# Patient Record
Sex: MALE | Race: WHITE | NOT HISPANIC OR LATINO | Employment: OTHER | URBAN - NONMETROPOLITAN AREA
[De-identification: names, ages, dates, MRNs, and addresses within clinical notes are randomized per-mention and may not be internally consistent; named-entity substitution may affect disease eponyms.]

---

## 2020-11-20 ENCOUNTER — OUTSIDE FACILITY SERVICE (OUTPATIENT)
Dept: CARDIOLOGY | Facility: CLINIC | Age: 75
End: 2020-11-20

## 2020-11-20 PROCEDURE — 93010 ELECTROCARDIOGRAM REPORT: CPT | Performed by: INTERNAL MEDICINE

## 2023-03-28 ENCOUNTER — PROCEDURE VISIT (OUTPATIENT)
Dept: ENT CLINIC | Age: 78
End: 2023-03-28
Payer: MEDICARE

## 2023-03-28 ENCOUNTER — OFFICE VISIT (OUTPATIENT)
Dept: ENT CLINIC | Age: 78
End: 2023-03-28
Payer: MEDICARE

## 2023-03-28 VITALS
HEIGHT: 70 IN | BODY MASS INDEX: 25.77 KG/M2 | SYSTOLIC BLOOD PRESSURE: 124 MMHG | DIASTOLIC BLOOD PRESSURE: 70 MMHG | WEIGHT: 180 LBS

## 2023-03-28 DIAGNOSIS — H69.83 DYSFUNCTION OF BOTH EUSTACHIAN TUBES: Primary | ICD-10-CM

## 2023-03-28 DIAGNOSIS — H90.A22 SENSORINEURAL HEARING LOSS (SNHL) OF LEFT EAR WITH RESTRICTED HEARING OF RIGHT EAR: ICD-10-CM

## 2023-03-28 DIAGNOSIS — H90.8 MIXED CONDUCTIVE AND SENSORINEURAL HEARING LOSS, UNSPECIFIED LATERALITY: ICD-10-CM

## 2023-03-28 DIAGNOSIS — H65.91 MEE (MIDDLE EAR EFFUSION), RIGHT: Primary | ICD-10-CM

## 2023-03-28 DIAGNOSIS — H90.A31 MIXED CONDUCTIVE AND SENSORINEURAL HEARING LOSS OF RIGHT EAR WITH RESTRICTED HEARING OF LEFT EAR: ICD-10-CM

## 2023-03-28 PROCEDURE — 4004F PT TOBACCO SCREEN RCVD TLK: CPT | Performed by: PHYSICIAN ASSISTANT

## 2023-03-28 PROCEDURE — 99203 OFFICE O/P NEW LOW 30 MIN: CPT | Performed by: PHYSICIAN ASSISTANT

## 2023-03-28 PROCEDURE — G8427 DOCREV CUR MEDS BY ELIG CLIN: HCPCS | Performed by: PHYSICIAN ASSISTANT

## 2023-03-28 PROCEDURE — 92567 TYMPANOMETRY: CPT | Performed by: AUDIOLOGIST

## 2023-03-28 PROCEDURE — G8484 FLU IMMUNIZE NO ADMIN: HCPCS | Performed by: PHYSICIAN ASSISTANT

## 2023-03-28 PROCEDURE — 1123F ACP DISCUSS/DSCN MKR DOCD: CPT | Performed by: PHYSICIAN ASSISTANT

## 2023-03-28 PROCEDURE — G8417 CALC BMI ABV UP PARAM F/U: HCPCS | Performed by: PHYSICIAN ASSISTANT

## 2023-03-28 PROCEDURE — 92553 AUDIOMETRY AIR & BONE: CPT | Performed by: AUDIOLOGIST

## 2023-03-28 RX ORDER — AMLODIPINE BESYLATE 10 MG/1
TABLET ORAL
COMMUNITY
Start: 2023-01-05

## 2023-03-28 RX ORDER — CEFUROXIME AXETIL 500 MG/1
500 TABLET ORAL 2 TIMES DAILY
Qty: 20 TABLET | Refills: 0 | Status: SHIPPED | OUTPATIENT
Start: 2023-03-28 | End: 2023-04-07

## 2023-03-28 RX ORDER — ATORVASTATIN CALCIUM 40 MG/1
TABLET, FILM COATED ORAL
COMMUNITY

## 2023-03-28 RX ORDER — LOSARTAN POTASSIUM 25 MG/1
TABLET ORAL
COMMUNITY

## 2023-03-28 RX ORDER — WARFARIN SODIUM 5 MG/1
TABLET ORAL
COMMUNITY
Start: 2022-11-01

## 2023-03-28 RX ORDER — ASPIRIN 81 MG/1
TABLET ORAL
COMMUNITY

## 2023-03-28 RX ORDER — TRIAMCINOLONE ACETONIDE 55 UG/1
2 SPRAY, METERED NASAL 2 TIMES DAILY
Qty: 16 G | Refills: 2 | Status: SHIPPED | OUTPATIENT
Start: 2023-03-28

## 2023-03-28 RX ORDER — CYANOCOBALAMIN 1000 UG/ML
INJECTION, SOLUTION INTRAMUSCULAR; SUBCUTANEOUS
COMMUNITY
Start: 2023-02-23

## 2023-03-28 RX ORDER — METOPROLOL TARTRATE 100 MG/1
TABLET ORAL
COMMUNITY
Start: 2023-01-27

## 2023-03-28 RX ORDER — OMEPRAZOLE 20 MG/1
TABLET, DELAYED RELEASE ORAL
COMMUNITY

## 2023-03-28 RX ORDER — GLIPIZIDE 5 MG/1
TABLET ORAL
COMMUNITY

## 2023-03-28 RX ORDER — OXYMETAZOLINE HYDROCHLORIDE 0.05 G/100ML
SPRAY NASAL
Qty: 30 ML | Refills: 0 | Status: SHIPPED | OUTPATIENT
Start: 2023-03-28

## 2023-03-28 RX ORDER — AMIODARONE HYDROCHLORIDE 200 MG/1
TABLET ORAL
COMMUNITY
Start: 2023-03-26

## 2023-03-28 ASSESSMENT — ENCOUNTER SYMPTOMS
VOICE CHANGE: 0
PHOTOPHOBIA: 0
FACIAL SWELLING: 0
SORE THROAT: 0
TROUBLE SWALLOWING: 0
RHINORRHEA: 0
SINUS PRESSURE: 0
EYE PAIN: 0
SINUS PAIN: 0

## 2023-03-28 NOTE — PROGRESS NOTES
History   Wander Roberts is a 76 y.o. male who presented to the clinic this date with complaints of right TM perforation. His PCP also thought there might be blood behind the TM. About a month and a half ago his right ear popped on two occassions. He did not have drainage. He note right aural fullness and decreased hearing. He has tinnitus, primarily in the right ear. Summary   Tympanometry consistent with middle ear effusion right and negative middle ear pressure left. Pure tone testing indicates moderate to severe mixed hearing loss right and mld to severe SNHL left. Results   Otoscopy:   Right: Dull TM  Left: Clear EAC/Normal TM    Audiometry:   Right:  Moderate to severe  sloping mixed hearing loss  Left:  Mild to severe  sloping SNHL         Tympanometry:    Right: Type B  Left: Type C    Plan   Results of today's testing were discussed with Mr. Calin Noel and the following recommendations were made: Follow up with ENT as scheduled. Recheck hearing following medical management.          Audiogram and Acoustic Immittance

## 2023-03-28 NOTE — PROGRESS NOTES
no abnormalities to the posterior pharynx. Assessment & Plan:    Problem List Items Addressed This Visit       TENZIN (middle ear effusion), right - Primary     Right middle ear effusion with negative middle ear pressure to the left ear  Plan: I will place the patient on Nasacort spray twice a day as well as Afrin twice a day. Even though he is noted with bennett fluid, we will place him on Ceftin for 10 days for antibiotic coverage. He is to follow-up in 2 weeks for reevaluation. Mixed conductive and sensorineural hearing loss     Mixed moderate to severe hearing loss of the right ear with mild to severe sloping sensorineural hearing loss to the left ear  Plan: I recommended repeating his audiology study in 1 year due to his hearing deficit. He was also advised to do a hearing aid trial if he has issues trying to hear after the middle ear effusion has resolved. No orders of the defined types were placed in this encounter. Orders Placed This Encounter   Medications    triamcinolone (NASACORT) 55 MCG/ACT nasal inhaler     Si sprays by Each Nostril route 2 times daily     Dispense:  16 g     Refill:  2    oxymetazoline (AFRIN) 0.05 % nasal spray     Si squirts to each nostril twice a day x10 days     Dispense:  30 mL     Refill:  0    cefUROXime (CEFTIN) 500 MG tablet     Sig: Take 1 tablet by mouth 2 times daily for 10 days     Dispense:  20 tablet     Refill:  0       Electronically signed by Codie Veronica PA-C on 3/28/23 at 11:37 AM CDT        Please note that this chart was generated using dragon dictation software. Although every effort was made to ensure the accuracy of this automated transcription, some errors in transcription may have occurred.

## 2023-03-28 NOTE — ASSESSMENT & PLAN NOTE
Right middle ear effusion with negative middle ear pressure to the left ear  Plan: I will place the patient on Nasacort spray twice a day as well as Afrin twice a day. Even though he is noted with bennett fluid, we will place him on Ceftin for 10 days for antibiotic coverage. He is to follow-up in 2 weeks for reevaluation.

## 2023-03-28 NOTE — ASSESSMENT & PLAN NOTE
Mixed moderate to severe hearing loss of the right ear with mild to severe sloping sensorineural hearing loss to the left ear  Plan: I recommended repeating his audiology study in 1 year due to his hearing deficit. He was also advised to do a hearing aid trial if he has issues trying to hear after the middle ear effusion has resolved.

## 2023-03-28 NOTE — LETTER
March 28, 2023      Cody Martínez MD  [address] [id number]      Patient: Hellen Carney   MR Number: [id number]   YOB: 1945   Date of Visit: 3/28/2023       Dear Dr. Modesto Cross,    Thank you for referring Citlali Graf to me for evaluation/treatment. Below are the relevant portions of my assessment and plan of care. If you have questions, please do not hesitate to call me. I look forward to following Elsa Ochoa along with you.     Sincerely,  Lawson Brunson PA-C

## 2023-04-27 ENCOUNTER — OFFICE VISIT (OUTPATIENT)
Dept: ENT CLINIC | Age: 78
End: 2023-04-27
Payer: MEDICARE

## 2023-04-27 VITALS
BODY MASS INDEX: 25.91 KG/M2 | WEIGHT: 181 LBS | SYSTOLIC BLOOD PRESSURE: 130 MMHG | DIASTOLIC BLOOD PRESSURE: 70 MMHG | HEIGHT: 70 IN

## 2023-04-27 DIAGNOSIS — H65.91 MEE (MIDDLE EAR EFFUSION), RIGHT: Primary | ICD-10-CM

## 2023-04-27 PROCEDURE — 4004F PT TOBACCO SCREEN RCVD TLK: CPT | Performed by: PHYSICIAN ASSISTANT

## 2023-04-27 PROCEDURE — G8427 DOCREV CUR MEDS BY ELIG CLIN: HCPCS | Performed by: PHYSICIAN ASSISTANT

## 2023-04-27 PROCEDURE — 1123F ACP DISCUSS/DSCN MKR DOCD: CPT | Performed by: PHYSICIAN ASSISTANT

## 2023-04-27 PROCEDURE — 99213 OFFICE O/P EST LOW 20 MIN: CPT | Performed by: PHYSICIAN ASSISTANT

## 2023-04-27 PROCEDURE — G8417 CALC BMI ABV UP PARAM F/U: HCPCS | Performed by: PHYSICIAN ASSISTANT

## 2023-04-27 NOTE — PROGRESS NOTES
Mr. Amelia Corea is a pleasant 68-year-old  male that presents to follow-up due to a right middle ear effusion and negative middle ear pressure to the left ear. Patient reports he has completed the medication and feels like he has not had any change. Denies any drainage from the ear canal or any issues with vertigo. Physical examination with microscope revealed evidence of a persistent right middle ear effusion with probabilities of my exam.  Left ear demonstrated a retracted TM with no evidence of fluid. Tympanogram was repeated today demonstrated a type B to the right with the left side noted to be actually improved. Neck exam demonstrated no adenopathy thyromegaly. Oral exam is unrevealing. Impression: Persistent right middle ear effusion with failed medical management    Plan: I recommended the patient to see Dr. Redd Saldaña for consideration of a myringotomy to the right ear versus eustachian tube dilatation. Patient is agreeable and wishes to proceed. He was reminded to call if he has any questions or problems.       Electronically signed by Michael Laws PA-C on 4/27/23 at 12:25 PM CDT

## 2023-05-10 ENCOUNTER — OFFICE VISIT (OUTPATIENT)
Dept: ENT CLINIC | Age: 78
End: 2023-05-10
Payer: MEDICARE

## 2023-05-10 VITALS
DIASTOLIC BLOOD PRESSURE: 80 MMHG | HEIGHT: 70 IN | BODY MASS INDEX: 25.62 KG/M2 | WEIGHT: 179 LBS | SYSTOLIC BLOOD PRESSURE: 130 MMHG

## 2023-05-10 DIAGNOSIS — H69.81 DYSFUNCTION OF RIGHT EUSTACHIAN TUBE: Primary | ICD-10-CM

## 2023-05-10 DIAGNOSIS — H90.A31 MIXED CONDUCTIVE AND SENSORINEURAL HEARING LOSS OF RIGHT EAR WITH RESTRICTED HEARING OF LEFT EAR: ICD-10-CM

## 2023-05-10 PROCEDURE — 99214 OFFICE O/P EST MOD 30 MIN: CPT | Performed by: OTOLARYNGOLOGY

## 2023-05-10 PROCEDURE — 1036F TOBACCO NON-USER: CPT | Performed by: OTOLARYNGOLOGY

## 2023-05-10 PROCEDURE — G8427 DOCREV CUR MEDS BY ELIG CLIN: HCPCS | Performed by: OTOLARYNGOLOGY

## 2023-05-10 PROCEDURE — 1123F ACP DISCUSS/DSCN MKR DOCD: CPT | Performed by: OTOLARYNGOLOGY

## 2023-05-10 PROCEDURE — G8417 CALC BMI ABV UP PARAM F/U: HCPCS | Performed by: OTOLARYNGOLOGY

## 2023-05-10 PROCEDURE — 69420 INCISION OF EARDRUM: CPT | Performed by: OTOLARYNGOLOGY

## 2023-05-10 RX ORDER — OFLOXACIN 3 MG/ML
5 SOLUTION AURICULAR (OTIC) 2 TIMES DAILY
Qty: 10 ML | Refills: 0 | Status: SHIPPED | OUTPATIENT
Start: 2023-05-10 | End: 2023-05-13

## 2023-05-10 ASSESSMENT — ENCOUNTER SYMPTOMS
ALLERGIC/IMMUNOLOGIC NEGATIVE: 1
EYES NEGATIVE: 1
GASTROINTESTINAL NEGATIVE: 1
RESPIRATORY NEGATIVE: 1

## 2023-05-10 NOTE — PROGRESS NOTES
5/10/2023    Katerine Mejia (:  1945) is a 76 y.o. male, Established patient, here for evaluation of the following chief complaint(s):  New Patient (Right ear)      Vitals:    05/10/23 1345   BP: 130/80   Weight: 179 lb (81.2 kg)   Height: 5' 10\" (1.778 m)       Wt Readings from Last 3 Encounters:   05/10/23 179 lb (81.2 kg)   23 181 lb (82.1 kg)   23 180 lb (81.6 kg)       BP Readings from Last 3 Encounters:   05/10/23 130/80   23 130/70   23 124/70         SUBJECTIVE/OBJECTIVE:    Patient seen today for his ears. He suffers from pressure and hearing loss on the right. He says he heard a pop twice about a month and a half ago and then he could not hear out of that side. The left side has hearing loss but nearly as bad as the right. Recent hearing test does show a type B tympanogram on that side. Review of Systems   Constitutional: Negative. HENT:  Positive for hearing loss. Eyes: Negative. Respiratory: Negative. Cardiovascular: Negative. Gastrointestinal: Negative. Endocrine: Negative. Musculoskeletal: Negative. Skin: Negative. Allergic/Immunologic: Negative. Neurological: Negative. Hematological: Negative. Psychiatric/Behavioral: Negative. Physical Exam  Vitals reviewed. Constitutional:       Appearance: Normal appearance. He is normal weight. HENT:      Head: Normocephalic and atraumatic. Right Ear: Tympanic membrane, ear canal and external ear normal.      Left Ear: Tympanic membrane, ear canal and external ear normal.      Nose: Nose normal.      Mouth/Throat:      Mouth: Mucous membranes are moist.      Pharynx: Oropharynx is clear. Eyes:      Extraocular Movements: Extraocular movements intact. Pupils: Pupils are equal, round, and reactive to light. Cardiovascular:      Rate and Rhythm: Normal rate and regular rhythm.    Pulmonary:      Effort: Pulmonary effort is normal.      Breath sounds:

## 2023-06-07 ENCOUNTER — OFFICE VISIT (OUTPATIENT)
Dept: ENT CLINIC | Age: 78
End: 2023-06-07
Payer: MEDICARE

## 2023-06-07 VITALS
HEIGHT: 70 IN | WEIGHT: 176 LBS | BODY MASS INDEX: 25.2 KG/M2 | DIASTOLIC BLOOD PRESSURE: 76 MMHG | SYSTOLIC BLOOD PRESSURE: 132 MMHG

## 2023-06-07 DIAGNOSIS — H69.81 DYSFUNCTION OF RIGHT EUSTACHIAN TUBE: ICD-10-CM

## 2023-06-07 DIAGNOSIS — H90.3 SENSORINEURAL HEARING LOSS (SNHL) OF BOTH EARS: Primary | ICD-10-CM

## 2023-06-07 PROCEDURE — 1036F TOBACCO NON-USER: CPT | Performed by: OTOLARYNGOLOGY

## 2023-06-07 PROCEDURE — G8417 CALC BMI ABV UP PARAM F/U: HCPCS | Performed by: OTOLARYNGOLOGY

## 2023-06-07 PROCEDURE — 99213 OFFICE O/P EST LOW 20 MIN: CPT | Performed by: OTOLARYNGOLOGY

## 2023-06-07 PROCEDURE — 1123F ACP DISCUSS/DSCN MKR DOCD: CPT | Performed by: OTOLARYNGOLOGY

## 2023-06-07 PROCEDURE — G8427 DOCREV CUR MEDS BY ELIG CLIN: HCPCS | Performed by: OTOLARYNGOLOGY

## 2023-06-07 PROCEDURE — 92504 EAR MICROSCOPY EXAMINATION: CPT | Performed by: OTOLARYNGOLOGY

## 2023-06-07 RX ORDER — LEVOCETIRIZINE DIHYDROCHLORIDE 5 MG/1
5 TABLET, FILM COATED ORAL NIGHTLY
Qty: 30 TABLET | Refills: 2 | Status: SHIPPED | OUTPATIENT
Start: 2023-06-07

## 2023-06-07 NOTE — PROGRESS NOTES
the right tympanic membrane. Myringotomy is completely closed. No middle ear effusion noted. ASSESSMENT/PLAN:    1. Sensorineural hearing loss (SNHL) of both ears  2. Dysfunction of right eustachian tube  Plan to start him on a daily antihistamine to see if and help keep his ear open. See him back in about 6 weeks. If the pressure comes back we will always talk about balloon with the tube. Return in about 6 weeks (around 7/19/2023). An electronic signature was used to authenticate this note. Luz Cummings MD       Please note that this chart was generated using dragon dictation software. Although every effort was made to ensure the accuracy of this automated transcription, some errors in transcription may have occurred.

## 2023-07-20 ENCOUNTER — OFFICE VISIT (OUTPATIENT)
Dept: ENT CLINIC | Age: 78
End: 2023-07-20
Payer: MEDICARE

## 2023-07-20 VITALS
WEIGHT: 176 LBS | DIASTOLIC BLOOD PRESSURE: 68 MMHG | BODY MASS INDEX: 25.2 KG/M2 | HEIGHT: 70 IN | SYSTOLIC BLOOD PRESSURE: 134 MMHG

## 2023-07-20 DIAGNOSIS — H69.81 CHRONIC DYSFUNCTION OF RIGHT EUSTACHIAN TUBE: Primary | ICD-10-CM

## 2023-07-20 PROCEDURE — 1036F TOBACCO NON-USER: CPT | Performed by: OTOLARYNGOLOGY

## 2023-07-20 PROCEDURE — G8417 CALC BMI ABV UP PARAM F/U: HCPCS | Performed by: OTOLARYNGOLOGY

## 2023-07-20 PROCEDURE — G8427 DOCREV CUR MEDS BY ELIG CLIN: HCPCS | Performed by: OTOLARYNGOLOGY

## 2023-07-20 PROCEDURE — 1123F ACP DISCUSS/DSCN MKR DOCD: CPT | Performed by: OTOLARYNGOLOGY

## 2023-07-20 PROCEDURE — 99213 OFFICE O/P EST LOW 20 MIN: CPT | Performed by: OTOLARYNGOLOGY

## 2023-07-20 ASSESSMENT — ENCOUNTER SYMPTOMS
EYES NEGATIVE: 1
RESPIRATORY NEGATIVE: 1
GASTROINTESTINAL NEGATIVE: 1
ALLERGIC/IMMUNOLOGIC NEGATIVE: 1

## 2023-07-20 NOTE — PROGRESS NOTES
2023    Stacey Hernández (:  1945) is a 76 y.o. male, Established patient, here for evaluation of the following chief complaint(s):  Follow-up (No complaints and did not take many antihistamines due to sleepy side effect)      Vitals:    23 1417   BP: 134/68   Weight: 176 lb (79.8 kg)   Height: 5' 10\" (1.778 m)       Wt Readings from Last 3 Encounters:   23 176 lb (79.8 kg)   23 176 lb (79.8 kg)   05/10/23 179 lb (81.2 kg)       BP Readings from Last 3 Encounters:   23 134/68   23 132/76   05/10/23 130/80         SUBJECTIVE/OBJECTIVE:    Patient seen today for his right ear. 2 visits ago I performed a myringotomy on the right and he says it helped. At the last visit he was still having benefit from it I placed him on antihistamine to help keep it open. He said the antihistamines make him very tired and stopped taking it. Still denies any pressure returned to his right ear. He says he feels great. Review of Systems   Constitutional: Negative. HENT: Negative. Eyes: Negative. Respiratory: Negative. Cardiovascular: Negative. Gastrointestinal: Negative. Endocrine: Negative. Musculoskeletal: Negative. Skin: Negative. Allergic/Immunologic: Negative. Neurological: Negative. Hematological: Negative. Psychiatric/Behavioral: Negative. Physical Exam  Vitals reviewed. Constitutional:       Appearance: Normal appearance. He is normal weight. HENT:      Head: Normocephalic and atraumatic. Right Ear: Tympanic membrane, ear canal and external ear normal.      Left Ear: Tympanic membrane, ear canal and external ear normal.      Nose: Nose normal.      Mouth/Throat:      Mouth: Mucous membranes are moist.      Pharynx: Oropharynx is clear. Eyes:      Extraocular Movements: Extraocular movements intact. Pupils: Pupils are equal, round, and reactive to light.    Cardiovascular:      Rate and Rhythm: Normal rate